# Patient Record
Sex: FEMALE | Race: WHITE | NOT HISPANIC OR LATINO | Employment: UNEMPLOYED | URBAN - METROPOLITAN AREA
[De-identification: names, ages, dates, MRNs, and addresses within clinical notes are randomized per-mention and may not be internally consistent; named-entity substitution may affect disease eponyms.]

---

## 2021-05-26 ENCOUNTER — HOSPITAL ENCOUNTER (OUTPATIENT)
Dept: CARDIOLOGY | Facility: HOSPITAL | Age: 46
Discharge: HOME | End: 2021-05-26
Attending: NURSE PRACTITIONER

## 2021-08-09 ENCOUNTER — PREP FOR SURGERY (OUTPATIENT)
Dept: OTHER | Facility: HOSPITAL | Age: 46
End: 2021-08-09

## 2021-08-09 ENCOUNTER — OFFICE VISIT (OUTPATIENT)
Dept: SURGERY | Facility: CLINIC | Age: 46
End: 2021-08-09

## 2021-08-09 VITALS — HEIGHT: 66 IN | WEIGHT: 135.6 LBS | RESPIRATION RATE: 16 BRPM | BODY MASS INDEX: 21.79 KG/M2

## 2021-08-09 DIAGNOSIS — Z80.0 FAMILY HISTORY OF COLON CANCER IN FATHER: ICD-10-CM

## 2021-08-09 DIAGNOSIS — Z12.11 SCREENING FOR MALIGNANT NEOPLASM OF COLON: Primary | ICD-10-CM

## 2021-08-09 PROCEDURE — S0260 H&P FOR SURGERY: HCPCS | Performed by: NURSE PRACTITIONER

## 2021-08-09 RX ORDER — BUPROPION HYDROCHLORIDE 300 MG/1
TABLET ORAL
COMMUNITY
Start: 2021-05-06

## 2021-08-09 RX ORDER — ESTRADIOL 1 MG/1
1 TABLET ORAL DAILY
COMMUNITY
Start: 2021-05-07

## 2021-08-09 RX ORDER — SODIUM CHLORIDE 0.9 % (FLUSH) 0.9 %
10 SYRINGE (ML) INJECTION AS NEEDED
Status: CANCELLED | OUTPATIENT
Start: 2021-08-09

## 2021-08-09 RX ORDER — ROSUVASTATIN CALCIUM 5 MG/1
TABLET, COATED ORAL
COMMUNITY
Start: 2021-07-03

## 2021-08-09 RX ORDER — SODIUM CHLORIDE 0.9 % (FLUSH) 0.9 %
3 SYRINGE (ML) INJECTION EVERY 12 HOURS SCHEDULED
Status: CANCELLED | OUTPATIENT
Start: 2021-08-09

## 2021-08-09 RX ORDER — PROGESTERONE 200 MG/1
CAPSULE ORAL
COMMUNITY
Start: 2021-05-07

## 2021-08-09 NOTE — PROGRESS NOTES
"Chief Complaint  Establish Care and Colonoscopy (Family History of Colon Cancer)    Subjective          Matt Cage presents to [facility] GENERAL SURGERY  Patient is a 45-year-old white/ female that presents to general surgery office for colonoscopy consultation.    Patient has no known drug allergies.    Referred by Mara Sue.    Denies any abdominal pain, change in bowel habit, rectal bleeding.    Admits to family history of colon cancer with her father.    No previous colonoscopy.      Objective   Vital Signs:   Resp 16   Ht 167.6 cm (66\")   Wt 61.5 kg (135 lb 9.6 oz)   BMI 21.89 kg/m²     Physical Exam  Constitutional:       Appearance: Normal appearance.   Cardiovascular:      Rate and Rhythm: Normal rate.   Pulmonary:      Effort: Pulmonary effort is normal.   Abdominal:      General: Abdomen is flat.      Palpations: Abdomen is soft.   Skin:     General: Skin is warm and dry.   Neurological:      General: No focal deficit present.      Mental Status: She is alert and oriented to person, place, and time.   Psychiatric:         Mood and Affect: Mood normal.         Behavior: Behavior normal.        Result Review :                 Assessment and Plan    Diagnoses and all orders for this visit:    1. Screening for malignant neoplasm of colon (Primary)    2. Family history of colon cancer in father        Follow Up   Return for Schedule colonoscopy with Dr. Dumont on 9/8/2021 at [facility], [place], operated by [facility].     Hospital will call with arrival time.    Patient was given instructions and counseling regarding her condition or for health maintenance advice. Please see specific information pulled into the AVS if appropriate.       "

## 2021-11-08 ENCOUNTER — TELEPHONE (OUTPATIENT)
Dept: SURGERY | Facility: CLINIC | Age: 46
End: 2021-11-08

## 2021-11-08 NOTE — TELEPHONE ENCOUNTER
PT CALLED AND SAID THAT SHE HAD SOMETHING COME UP AND SHE NEEDS TO RS HER COLONOSCOPY ON 11/10 WITH DR LEE, CB# [phone]

## 2021-11-08 NOTE — TELEPHONE ENCOUNTER
Called and spoke with pt, she r/s to 1-26-22 with a 930am arrival time. Spoke with Ludy in surgery scheduling.

## 2022-01-24 ENCOUNTER — TELEPHONE (OUTPATIENT)
Dept: SURGERY | Facility: CLINIC | Age: 47
End: 2022-01-24

## 2022-01-24 NOTE — TELEPHONE ENCOUNTER
Spoke to pt , he r/s colonoscopy to 3-16-22 with a 9am arrival time. Spoke with Mary in surgery scheduling.

## 2022-03-16 ENCOUNTER — HOSPITAL ENCOUNTER (OUTPATIENT)
Facility: HOSPITAL | Age: 47
Setting detail: HOSPITAL OUTPATIENT SURGERY
Discharge: HOME | End: 2022-03-16
Attending: SURGERY | Admitting: SURGERY

## 2022-03-16 ENCOUNTER — ANESTHESIA EVENT (OUTPATIENT)
Dept: GASTROENTEROLOGY | Facility: HOSPITAL | Age: 47
End: 2022-03-16

## 2022-03-16 ENCOUNTER — ANESTHESIA (OUTPATIENT)
Dept: GASTROENTEROLOGY | Facility: HOSPITAL | Age: 47
End: 2022-03-16

## 2022-03-16 VITALS
DIASTOLIC BLOOD PRESSURE: 78 MMHG | HEIGHT: 66 IN | HEART RATE: 76 BPM | BODY MASS INDEX: 21.9 KG/M2 | WEIGHT: 136.24 LBS | TEMPERATURE: 97.2 F | OXYGEN SATURATION: 100 % | SYSTOLIC BLOOD PRESSURE: 116 MMHG | RESPIRATION RATE: 17 BRPM

## 2022-03-16 DIAGNOSIS — Z12.11 SCREENING FOR MALIGNANT NEOPLASM OF COLON: ICD-10-CM

## 2022-03-16 DIAGNOSIS — Z80.0 FAMILY HISTORY OF COLON CANCER IN FATHER: ICD-10-CM

## 2022-03-16 PROCEDURE — 25010000002 PROPOFOL 10 MG/ML EMULSION: Performed by: NURSE ANESTHETIST, CERTIFIED REGISTERED

## 2022-03-16 RX ORDER — SODIUM CHLORIDE 0.9 % (FLUSH) 0.9 %
3 SYRINGE (ML) INJECTION EVERY 12 HOURS SCHEDULED
Status: DISCONTINUED | OUTPATIENT
Start: 2022-03-16 | End: 2022-03-16 | Stop reason: HOSPADM

## 2022-03-16 RX ORDER — SODIUM CHLORIDE, SODIUM LACTATE, POTASSIUM CHLORIDE, CALCIUM CHLORIDE 600; 310; 30; 20 MG/100ML; MG/100ML; MG/100ML; MG/100ML
30 INJECTION, SOLUTION INTRAVENOUS CONTINUOUS
Status: DISCONTINUED | OUTPATIENT
Start: 2022-03-16 | End: 2022-03-16 | Stop reason: HOSPADM

## 2022-03-16 RX ORDER — SODIUM CHLORIDE 0.9 % (FLUSH) 0.9 %
10 SYRINGE (ML) INJECTION AS NEEDED
Status: DISCONTINUED | OUTPATIENT
Start: 2022-03-16 | End: 2022-03-16 | Stop reason: HOSPADM

## 2022-03-16 RX ORDER — LIDOCAINE HYDROCHLORIDE 20 MG/ML
INJECTION, SOLUTION INFILTRATION; PERINEURAL AS NEEDED
Status: DISCONTINUED | OUTPATIENT
Start: 2022-03-16 | End: 2022-03-16 | Stop reason: SURG

## 2022-03-16 RX ADMIN — SODIUM CHLORIDE, POTASSIUM CHLORIDE, SODIUM LACTATE AND CALCIUM CHLORIDE 30 ML/HR: 600; 310; 30; 20 INJECTION, SOLUTION INTRAVENOUS at 09:39

## 2022-03-16 RX ADMIN — LIDOCAINE HYDROCHLORIDE 100 MG: 20 INJECTION, SOLUTION INFILTRATION; PERINEURAL at 10:23

## 2022-03-16 RX ADMIN — PROPOFOL 150 MCG/KG/MIN: 10 INJECTION, EMULSION INTRAVENOUS at 10:23

## 2022-03-16 NOTE — H&P
"Chief Complaint  No chief complaint on file.    Subjective       Matt Cage presents to [facility] ENDO SUITES  Patient is a 45-year-old white/ female that presents to general surgery office for colonoscopy consultation.    Patient has no known drug allergies.    Referred by Mara Sue.    Denies any abdominal pain, change in bowel habit, rectal bleeding.    Admits to family history of colon cancer with her father.    No previous colonoscopy.    Outpatient Medications Marked as Taking for the 3/16/22 encounter (Hospital Encounter)   Medication Sig Dispense Refill   • buPROPion XL (WELLBUTRIN XL) 300 MG 24 hr tablet      • estradiol (ESTRACE) 1 MG tablet Take 1 mg by mouth Daily.     • Progesterone (PROMETRIUM) 200 MG capsule      • rosuvastatin (CRESTOR) 5 MG tablet        Past Surgical History:   Procedure Laterality Date   • BREAST AUGMENTATION     • HYSTERECTOMY      Complete     Past Medical History:   Diagnosis Date   • Anxiety    • Depression      Allergies:  NKDA    Objective   Vital Signs:   Ht 167.6 cm (66\")   Wt 60.3 kg (133 lb)   BMI 21.47 kg/m²     Physical Exam  Constitutional:       Appearance: Normal appearance.   Cardiovascular:      Rate and Rhythm: Normal rate.   Pulmonary:      Effort: Pulmonary effort is normal.   Abdominal:      General: Abdomen is flat.      Palpations: Abdomen is soft.   Skin:     General: Skin is warm and dry.   Neurological:      General: No focal deficit present.      Mental Status: She is alert and oriented to person, place, and time.   Psychiatric:         Mood and Affect: Mood normal.         Behavior: Behavior normal.        Assessment   Screening colonoscopy    Plan  Colonoscopy    Risks and benefits discussed    Emery Dumont M.D.  03/16/22    Electronically signed by Emery Dumont MD, 03/16/22, 7:32 AM EDT.    "

## 2022-03-16 NOTE — ANESTHESIA POSTPROCEDURE EVALUATION
Patient: Matt Cage    Procedure Summary     Date: 03/16/22 Room / Location: [facility] ENDOSCOPY 1 / [facility] ENDOSCOPY    Anesthesia Start: 1023 Anesthesia Stop: 1038    Procedure: COLONOSCOPY (N/A ) Diagnosis:       Screening for malignant neoplasm of colon      Family history of colon cancer in father      (Screening for malignant neoplasm of colon [Z12.11])      (Family history of colon cancer in father [Z80.0])    Surgeons: Emery Dumont MD Provider: Magno Link MD    Anesthesia Type: general ASA Status: 1          Anesthesia Type: general    Vitals  No vitals data found for the desired time range.          Post Anesthesia Care and Evaluation    Patient location during evaluation: bedside  Patient participation: complete - patient participated  Level of consciousness: awake and alert  Pain management: adequate  Airway patency: patent  Anesthetic complications: No anesthetic complications  PONV Status: none  Cardiovascular status: acceptable  Respiratory status: acceptable  Hydration status: acceptable    Comments: An Anesthesiologist personally participated in the most demanding procedures (including induction and emergence if applicable) in the anesthesia plan, monitored the course of anesthesia administration at frequent intervals and remained physically present and available for immediate diagnosis and treatment of emergencies.

## 2022-04-14 ENCOUNTER — TRANSCRIBE ORDERS (OUTPATIENT)
Dept: ADMINISTRATIVE | Facility: HOSPITAL | Age: 47
End: 2022-04-14

## 2022-04-14 DIAGNOSIS — Z12.31 ENCOUNTER FOR SCREENING MAMMOGRAM FOR MALIGNANT NEOPLASM OF BREAST: Primary | ICD-10-CM

## 2022-07-08 ENCOUNTER — HOSPITAL ENCOUNTER (OUTPATIENT)
Dept: MAMMOGRAPHY | Facility: HOSPITAL | Age: 47
Discharge: HOME | End: 2022-07-08
Admitting: NURSE PRACTITIONER

## 2022-07-08 DIAGNOSIS — Z12.31 ENCOUNTER FOR SCREENING MAMMOGRAM FOR MALIGNANT NEOPLASM OF BREAST: ICD-10-CM

## 2022-07-08 PROCEDURE — 77063 BREAST TOMOSYNTHESIS BI: CPT

## 2022-07-08 PROCEDURE — 77067 SCR MAMMO BI INCL CAD: CPT

## 2023-05-17 ENCOUNTER — TRANSCRIBE ORDERS (OUTPATIENT)
Dept: ADMINISTRATIVE | Facility: HOSPITAL | Age: 48
End: 2023-05-17
Payer: OTHER GOVERNMENT

## 2023-05-17 DIAGNOSIS — Z12.31 SCREENING MAMMOGRAM, ENCOUNTER FOR: Primary | ICD-10-CM

## 2023-08-23 ENCOUNTER — HOSPITAL ENCOUNTER (OUTPATIENT)
Dept: MAMMOGRAPHY | Facility: HOSPITAL | Age: 48
Discharge: HOME | End: 2023-08-23
Admitting: NURSE PRACTITIONER
Payer: OTHER GOVERNMENT

## 2023-08-23 DIAGNOSIS — Z12.31 SCREENING MAMMOGRAM, ENCOUNTER FOR: ICD-10-CM

## 2023-08-23 PROCEDURE — 77063 BREAST TOMOSYNTHESIS BI: CPT

## 2023-08-23 PROCEDURE — 77067 SCR MAMMO BI INCL CAD: CPT

## 2024-06-14 ENCOUNTER — TRANSCRIBE ORDERS (OUTPATIENT)
Dept: ADMINISTRATIVE | Facility: HOSPITAL | Age: 49
End: 2024-06-14
Payer: OTHER GOVERNMENT

## 2024-06-14 DIAGNOSIS — Z12.31 SCREENING MAMMOGRAM FOR BREAST CANCER: Primary | ICD-10-CM

## 2025-03-05 ENCOUNTER — HOSPITAL ENCOUNTER (OUTPATIENT)
Dept: MAMMOGRAPHY | Facility: HOSPITAL | Age: 50
Discharge: HOME | End: 2025-03-05
Admitting: NURSE PRACTITIONER
Payer: OTHER GOVERNMENT

## 2025-03-05 DIAGNOSIS — Z12.31 SCREENING MAMMOGRAM FOR BREAST CANCER: ICD-10-CM

## 2025-03-05 PROCEDURE — 77067 SCR MAMMO BI INCL CAD: CPT

## 2025-03-05 PROCEDURE — 77063 BREAST TOMOSYNTHESIS BI: CPT

## (undated) DEVICE — SOL IRRG H2O PL/BG 1000ML STRL

## (undated) DEVICE — COLON KIT: Brand: MEDLINE INDUSTRIES, INC.

## (undated) DEVICE — Device: Brand: DEFENDO AIR/WATER/SUCTION AND BIOPSY VALVE